# Patient Record
Sex: MALE | Race: WHITE | Employment: STUDENT | URBAN - METROPOLITAN AREA
[De-identification: names, ages, dates, MRNs, and addresses within clinical notes are randomized per-mention and may not be internally consistent; named-entity substitution may affect disease eponyms.]

---

## 2021-04-29 ENCOUNTER — APPOINTMENT (OUTPATIENT)
Dept: OTHER | Facility: HOSPITAL | Age: 26
End: 2021-04-29
Attending: PREVENTIVE MEDICINE

## 2022-09-06 ENCOUNTER — OFFICE VISIT (OUTPATIENT)
Dept: FAMILY MEDICINE CLINIC | Facility: CLINIC | Age: 27
End: 2022-09-06
Payer: COMMERCIAL

## 2022-09-06 VITALS
DIASTOLIC BLOOD PRESSURE: 82 MMHG | RESPIRATION RATE: 18 BRPM | OXYGEN SATURATION: 97 % | TEMPERATURE: 98 F | SYSTOLIC BLOOD PRESSURE: 128 MMHG | HEART RATE: 103 BPM

## 2022-09-06 DIAGNOSIS — Z02.9 ADMINISTRATIVE ENCOUNTER: Primary | ICD-10-CM

## 2022-09-06 PROCEDURE — 3074F SYST BP LT 130 MM HG: CPT | Performed by: NURSE PRACTITIONER

## 2022-09-06 PROCEDURE — 3079F DIAST BP 80-89 MM HG: CPT | Performed by: NURSE PRACTITIONER

## 2022-09-06 NOTE — PROGRESS NOTES
CHIEF COMPLAINT:   No chief complaint on file. HPI:   Matt Verma is a 32year old male here for \"migraine headache\" for 2 weeks. Reports wakes up and goes to sleep with headache. Reports sounds and light makes it worse. Reports is right sided throbbing pain. Reports has hx of headaches in past but normally only last1-2 days. Has taken Motrin with some mild relief but doesn't help much and always seems to come back. He does states for the past 4 days has had some allergy like symptoms of congesiton/runny nose. Denies fevers, breathing problems. Denies dizziness, weakness, vision changes. D/w patient limitations of [facility], referred to [facility] for 2 weeks of headache not resolving with Motrin. NO charge for visit. Reports will try to go today, needs work note stating he came somewhere during day. No charge.

## (undated) NOTE — LETTER
Date: 9/6/2022    Patient Name: Sita Gan          To Whom it may concern: This letter has been written at the patient's request. The above patient was seen at the [facility] for treatment of a medical condition on 9/6/2022.      Sincerely,        CARINA Hare